# Patient Record
Sex: MALE | Race: WHITE
[De-identification: names, ages, dates, MRNs, and addresses within clinical notes are randomized per-mention and may not be internally consistent; named-entity substitution may affect disease eponyms.]

---

## 2017-01-28 ENCOUNTER — HOSPITAL ENCOUNTER (OUTPATIENT)
Dept: HOSPITAL 33 - ED | Age: 27
Setting detail: OBSERVATION
LOS: 2 days | Discharge: HOME | End: 2017-01-30
Attending: FAMILY MEDICINE | Admitting: FAMILY MEDICINE
Payer: COMMERCIAL

## 2017-01-28 DIAGNOSIS — Z72.0: ICD-10-CM

## 2017-01-28 DIAGNOSIS — L05.01: Primary | ICD-10-CM

## 2017-01-28 LAB
ALBUMIN SERPL-MCNC: 3.9 G/DL (ref 3.4–5)
ALP SERPL-CCNC: 91 U/L (ref 46–116)
ALT SERPL-CCNC: 23 U/L (ref 12–78)
ANION GAP SERPL CALC-SCNC: 17.1 MEQ/L (ref 5–15)
AST SERPL QL: 18 U/L (ref 15–37)
BASOPHILS NFR BLD AUTO: 0.2 % (ref 0–0.4)
BILIRUB BLD-MCNC: 1.1 MG/DL (ref 0.2–1)
BUN SERPL-MCNC: 14 MG/DL (ref 9–20)
CHLORIDE SERPL-SCNC: 105 MEQ/L (ref 98–107)
CO2 SERPL-SCNC: 24.6 MEQ/L (ref 21–32)
GLUCOSE SERPL-MCNC: 115 MG/DL (ref 70–110)
MCH RBC QN AUTO: 29.8 PG (ref 26–32)
NEUTROPHILS NFR BLD AUTO: 71.2 % (ref 36–66)
PLATELET # BLD AUTO: 194 K/MM3 (ref 150–450)
POTASSIUM SERPLBLD-SCNC: 4.1 MEQ/L (ref 3.5–5.1)
PROT SERPL-MCNC: 7.2 GM/DL (ref 6.4–8.2)
RBC # BLD AUTO: 4.96 M/MM3 (ref 4.1–5.6)
SODIUM SERPL-SCNC: 143 MEQ/L (ref 136–145)
WBC # BLD AUTO: 9.4 K/MM3 (ref 4–10.5)

## 2017-01-28 PROCEDURE — 36000 PLACE NEEDLE IN VEIN: CPT

## 2017-01-28 PROCEDURE — 94762 N-INVAS EAR/PLS OXIMTRY CONT: CPT

## 2017-01-28 PROCEDURE — 85025 COMPLETE CBC W/AUTO DIFF WBC: CPT

## 2017-01-28 PROCEDURE — 99140 ANES COMP EMERGENCY COND: CPT

## 2017-01-28 PROCEDURE — 99284 EMERGENCY DEPT VISIT MOD MDM: CPT

## 2017-01-28 PROCEDURE — 85027 COMPLETE CBC AUTOMATED: CPT

## 2017-01-28 PROCEDURE — 80053 COMPREHEN METABOLIC PANEL: CPT

## 2017-01-28 PROCEDURE — 87070 CULTURE OTHR SPECIMN AEROBIC: CPT

## 2017-01-28 PROCEDURE — 87040 BLOOD CULTURE FOR BACTERIA: CPT

## 2017-01-28 PROCEDURE — 0HB8XZZ EXCISION OF BUTTOCK SKIN, EXTERNAL APPROACH: ICD-10-PCS | Performed by: SURGERY

## 2017-01-28 PROCEDURE — 00300 ANES ALL PX INTEG H/N/PTRUNK: CPT

## 2017-01-28 PROCEDURE — 36415 COLL VENOUS BLD VENIPUNCTURE: CPT

## 2017-01-28 RX ADMIN — MORPHINE SULFATE PRN MG: 1 INJECTION, SOLUTION INTRAVENOUS at 18:26

## 2017-01-28 NOTE — ERPHSYRPT
- History of Present Illness


Time Seen by Provider: 17 10:23


Source: patient


Exam Limitations: no limitations


Patient Subjective Stated Complaint: abscess  on tailbone


Triage Nursing Assessment: patient has 3 cm wide by 5 cm long abscess on 

tailbone in crevice of cheeks on right side


Physician History: 





abscess in mid area of tail bone for 3 days


Timing/Duration: day(s) (3)


Severity: moderate


Associated Symptoms: denies symptoms


Allergies/Adverse Reactions: 








No Known Drug Allergies Allergy (Unverified 17 10:21)


 





Hx Tetanus, Diphtheria Vaccination/Date Given: No


Hx Influenza Vaccination/Date Given: No


Hx Pneumococcal Vaccination/Date Given: No


Immunizations Up to Date: No





- Review of Systems


Constitutional: No Symptoms


Eyes: No Symptoms


Ears, Nose, & Throat: No Symptoms


Respiratory: No Symptoms


Skin: Cellulitis





- Past Medical History


Pertinent Past Medical History: No





- Past Surgical History


Past Surgical History: No





- Social History


Smoking Status: Current every day smoker


Drug Use: none





- Nursing Vital Signs


Nursing Vital Signs: 


 Initial Vital Signs











Temperature                    97.6 F


 


Temperature Source             Oral


 


Pulse Rate                     78


 


Respiratory Rate               20


 


Blood Pressure [Left Arm]      136/84


 


Pain Intensity                 8

















- Physical Exam


General Appearance: no apparent distress, alert


Eye Exam: PERRL/EOMI, eyes nml inspection


Ears, Nose, Throat Exam: normal ENT inspection, TMs normal, pharynx normal, 

moist mucous membranes


Neck Exam: normal inspection, non-tender, supple, full range of motion


Respiratory Exam: normal breath sounds, lungs clear, No respiratory distress


Cardiovascular Exam: regular rate/rhythm, normal heart sounds, normal 

peripheral pulses


Gastrointestinal/Abdomen Exam: soft, normal bowel sounds, No tenderness, No mass


Back Exam: normal inspection, normal range of motion, No CVA tenderness, No 

vertebral tenderness


Extremity Exam: normal inspection, normal range of motion, pelvis stable


Neurologic Exam: alert, oriented x 3, cooperative, normal mood/affect, nml 

cerebellar function, nml station & gait, sensation nml, No motor deficits


Skin Exam: normal color, warm, dry, other (5x5 cms sacrococcygeal abscess ), No 

rash


Lymphatic Exam: No adenopathy


SpO2: 96


Oxygen Delivery: Room Air





- Course


Nursing assessment & vital signs reviewed: Yes


Ordered Tests: 


 Active Orders 24 hr











 Category Date Time Status


 


 IV Insertion STAT Care  17 10:22 Active


 


 BLOOD CULTURE Stat Lab  17 10:40 Received


 


 CBC W DIFF Stat Lab  17 10:30 Completed


 


 CMP Stat Lab  17 10:30 Received


 


 Transfer Order Routine Transfer  17 10:35 Ordered








Medication Summary











Generic Name Dose Route Start Last Admin





  Trade Name Jeevan  PRN Reason Stop Dose Admin


 


Levofloxacin/Dextrose  150 mls @ 100 mls/hr  17 10:22  17 10:26





  Levofloxacin 750mg/150ml D5w  IV  17 11:51  100 mls/hr





  STAT ONE   Administration














Discontinued Medications














Generic Name Dose Route Start Last Admin





  Trade Name Jeevan  PRN Reason Stop Dose Admin


 


Levofloxacin/Dextrose  Confirm  17 10:26  





  Levofloxacin 750mg/150ml D5w  Administered  17 10:27  





  Dose   





  150 mls @ ud   





  IV   





  .STK-MED ONE   











Lab/Rad Data: 


 Laboratory Result Diagrams





 17 10:30 





 Laboratory Results











  17 Range/Units





  10:30 


 


WBC  9.4  (4.0-10.5)  K/mm3


 


RBC  4.96  (4.1-5.6)  M/mm3


 


Hgb  14.8  (12.5-18.0)  gm/dl


 


Hct  43.1  (42-50)  %


 


MCV  86.9  ()  fl


 


MCH  29.8  (26-32)  pg


 


MCHC  34.3  (32-36)  g/dl


 


RDW  13.1  (11.5-14.0)  %


 


Plt Count  194  (150-450)  K/mm3


 


MPV  8.9  (6-9.5)  fl


 


Gran %  71.2 H  (36.0-66.0)  %


 


Lymphocytes %  21.5 L  (24.0-44.0)  %


 


Monocytes %  5.8  (0.0-12.0)  %


 


Eosinophils %  1.3  (0.00-5.0)  %


 


Basophils %  0.2  (0.0-0.4)  %


 


Basophils #  0.02  (0-0.4)  














- Progress


Progress: unchanged, pain not gone completely


Discussed with Dr.: Cesar Henley


Will see patient in: hospital (observation)


Counseled pt/family regarding: lab results, diagnosis, need for follow-up





- Departure


Time of Disposition: 10:34


Departure Disposition: Observation


Clinical Impression: 


 Pilonidal abscess of pedro cleft





Condition: Fair


Critical Care Time: Yes


Critical Care Time(excluding separately billable procedures): 30-74 minutes


Referrals: 


ASHLYN AGUILAR MD [Primary Care Provider] -

## 2017-01-28 NOTE — PCM.SSS
History of Present Illness





- Chief Complaint


Chief Complaint: cellulitis


History of Present Illness: 


 is a 26 year old male who started having buttock pain 6 days ago.  He 

started treating with a lotion he had from a pilonidal cyst 2 years ago, but 

when it did not improve he came to [facility] today.  They did an I&D and it has been 

draining somewhat, but he was in so much pain he was sent to ER and admitted 

for surgical I&D. No fever. colin po (currently NPO for surgery).





- Review of Systems


Respiratory: Cough (few weeks ago, resolved)


Genitourinary Symptoms: Other (pain and swelling at proximal gluteal cleft)


Skin: Cellulitis


Psychological: Other (would really like a cigarette), No Suicidal Ideations





Medications & Allergies


Home Medications: 


 Home Medication List





No Reportable Medications [No Reported Medications]  01/28/17 [History 

Confirmed 01/28/17]








Allergies/Adverse Reactions: 


 Allergies











Allergy/AdvReac Type Severity Reaction Status Date / Time


 


No Known Drug Allergies Allergy   Unverified 01/28/17 10:21














- Past Medical History


Past Medical History: No


Neurological History: No Pertinent History


ENT History: No Pertinent History


Cardiac History: No Pertinent History


Respiratory History: No Pertinent History


Endocrine Medical History: No Pertinent History


Musculoskelatal History: No Pertinent History


GI Medical History: No Pertinent History


 History: No Pertinent History


Pyscho-Social History: No Pertinent History


Male Reproductive Disorders: No Pertinent History





- Past Surgical History


Past Surgical History: No


Neuro Surgical History: No Pertinent History


Cardiac History: No Pertinent History


Respiratory Surgery: No Pertinent History


GI Surgical History: No Pertinent History


Genitourinary Surgical Hx: No Pertinent History


Musculskeletal Surgical Hx: No Pertinent History


Male Surgical History: No Pertinent History


Other Surgical History: pt states he has had wisdom teeth removed, this is his 

only surgery





- Social History


Smoking Status: Current every day smoker


How long have you smoked: 15 yrs


Exposure to second hand smoke: No


Alcohol: Occasionally


Drug Use: none





- Physical Exam


Vital Signs: 


 Vital Signs - 24 hr











  Temp Pulse Resp BP BP Pulse Ox


 


 01/28/17 15:40  97.6 F  74  18  139/82   96


 


 01/28/17 11:31       96


 


 01/28/17 11:09  97.6 F  74  18   139/82  97


 


 01/28/17 11:00       96


 


 01/28/17 10:51       96


 


 01/28/17 10:05  97.6 F  78  20   136/84  96











General Appearance: mild distress


Neurologic Exam: alert, oriented x 3, cooperative


Eye Exam: eyes nml inspection


Ears, Nose, Throat Exam: moist mucous membranes


Neck Exam: normal inspection, non-tender, No lymphadenopathy


Respiratory Exam: normal breath sounds, lungs clear, No crackles/rales, No 

rhonchi, No wheezing


Cardiovascular Exam: regular rate/rhythm, normal heart sounds, No murmur


Gastrointestinal/Abdomen Exam: soft, normal bowel sounds, No tenderness, No 

distention, No guarding


Rectal Exam: other (superior gluteal cleft (gentle, limited exam) on the R s/p I

&D, some purple discoloration, no overt induration or fluctuance, pt is very 

tender)


Back Exam: normal inspection


Extremity Exam: No pedal edema, No swelling


Skin Exam: warm, dry





Assessment/Plan


(1) Pilonidal abscess


Current Visit: Yes   Status: Acute   


Assessment & Plan: 


surgery to evaluate, thank you!  Likely home after I&D.


Code(s): L05.01 - PILONIDAL CYST WITH ABSCESS   





(2) Tobacco abuse


Current Visit: Yes   Status: Acute   


Assessment & Plan: 


I offered nicotine patch but he defers.


Code(s): Z72.0 - TOBACCO USE   





Hospital Summary





- Hospital Course


Hospital Course: 





Pt admitted through ER with pilonidal cyst.  Surgery is consulted; if I&D done 

I assume he will be discharged soon after.





- Vitals & Intake/Output


Vital Signs: 


 Vital Signs











Temperature  97.6 F   01/28/17 15:40


 


Pulse Rate  74   01/28/17 15:40


 


Respiratory Rate  18   01/28/17 15:40


 


Blood Pressure  139/82   01/28/17 15:40


 


O2 Sat by Pulse Oximetry  96   01/28/17 15:40











Intake & Output: 


 Intake & Output











 01/26/17 01/27/17 01/28/17 01/29/17





 11:59 11:59 11:59 11:59


 


Weight   120.656 kg 120.656 kg














- Lab


Result Diagrams: 


 01/28/17 10:30





 01/28/17 10:30





- Procedures and Test


Procedures and Tests throughout Hospitalization: 


 Therapy Orders & Screens





01/28/17 12:06


PT Screen per Nursing Assess ONCE 


   Comment: Protocol Order


   Physician Instructions: Greater than 3 points order PT Admission Screenin


   Reason For Exam: Triggered on Admission


   Diagnosis: cellulitis


   Open Wound/Cellutlitis/Pressure Ulcers: Yes


   Acute Fx/ORIF/Change in wt bearing status: No


   Severe MUSCULOSKELETAL pain: No


   ADL Dysfunction: No


   Acute CVA w/Hemiparesis/Hemiplegia: No


   Decreased Functional Mobility/Strength: No


   Sprain/Strain: No


   Acute Post-op Mobility Dysfunction: No


   Total Points: 5


Smoking Cessation Education ONCE 


   Comment: 


   Diagnosis: cellulitis


   Smoking Status: Current every day smoker


   How long have you smoked: 15 yrs


   Have you smoked in the past 12 months: Yes


   Approximately how many cigarettes per day: 10


   Do you dip or chew tobacco: Yes














- Discharge


Disposition: Home, Self-Care


Condition: Good


Prescriptions: 


No Action


   No Reportable Medications [No Reported Medications] 


Follow up with: 


ASHLYN AGUILAR MD [Primary Care Provider] - 


Forms:  Patient Portal Information

## 2017-01-29 LAB
MCH RBC QN AUTO: 29.6 PG (ref 26–32)
PLATELET # BLD AUTO: 252 K/MM3 (ref 150–450)
RBC # BLD AUTO: 4.97 M/MM3 (ref 4.1–5.6)
WBC # BLD AUTO: 11.8 K/MM3 (ref 4–10.5)

## 2017-01-29 RX ADMIN — HYDROCODONE BITARTRATE AND ACETAMINOPHEN PRN TAB: 5; 325 TABLET ORAL at 21:51

## 2017-01-29 RX ADMIN — LEVOFLOXACIN SCH MLS/HR: 5 INJECTION, SOLUTION INTRAVENOUS at 09:59

## 2017-01-29 RX ADMIN — MORPHINE SULFATE PRN MG: 1 INJECTION, SOLUTION INTRAVENOUS at 12:29

## 2017-01-29 RX ADMIN — HYDROCODONE BITARTRATE AND ACETAMINOPHEN PRN TAB: 5; 325 TABLET ORAL at 12:22

## 2017-01-29 NOTE — PCM.DS
Discharge Summary


Date of Admission: 


01/28/17 11:00





Admitting Physician: 


EREN HAYNES





Primary Care Provider: 


ASHLYN AGUILAR








Allergies


Allergies





No Known Drug Allergies Allergy (Unverified 01/28/17 10:21)


 











Hospital Summary





- Hospital Course


Hospital Course: 





Pt feeling better, decreased pain. I&D done, packing applied so pt was kept 

overnight by surgery.  Phong po fine.  





- Vitals & Intake/Output


Vital Signs: 





 Vital Signs











Temperature  98.1 F   01/29/17 12:22


 


Pulse Rate  62   01/29/17 07:50


 


Respiratory Rate  18   01/29/17 07:50


 


Blood Pressure  152/69   01/29/17 07:37


 


O2 Sat by Pulse Oximetry  97   01/29/17 12:29











Intake & Output: 





 Intake & Output











 01/27/17 01/28/17 01/29/17 01/30/17





 11:59 11:59 11:59 11:59


 


Intake Total   1884 


 


Output Total   500 


 


Balance   1384 


 


Weight  120.656 kg 120.656 kg 














- Lab


Result Diagrams: 


 01/29/17 05:30





 01/28/17 10:30


Lab Results-Last 24 Hrs: 





 Lab Results-Last 24 Hours











  01/29/17 Range/Units





  05:30 


 


WBC  11.8 H  (4.0-10.5)  K/mm3


 


RBC  4.97  (4.1-5.6)  M/mm3


 


Hgb  14.7  (12.5-18.0)  gm/dl


 


Hct  43.9  (42-50)  %


 


MCV  88.3  ()  fl


 


MCH  29.6  (26-32)  pg


 


MCHC  33.5  (32-36)  g/dl


 


RDW  13.1  (11.5-14.0)  %


 


Plt Count  252  (150-450)  K/mm3


 


MPV  9.1  (6-9.5)  fl











Micro Results-Entire Visit: 





 Microbiology











 01/28/17 17:16 Gram Stain - Final





 Coccyx 














- Procedures and Test


Procedures and Tests throughout Hospitalization: 





 Therapy Orders & Screens





01/28/17 12:06


PT Screen per Nursing Assess ONCE 


   Comment: Protocol Order


   Physician Instructions: Greater than 3 points order PT Admission Screenin


   Reason For Exam: Triggered on Admission


   Diagnosis: cellulitis


   Open Wound/Cellutlitis/Pressure Ulcers: Yes


   Acute Fx/ORIF/Change in wt bearing status: No


   Severe MUSCULOSKELETAL pain: No


   ADL Dysfunction: No


   Acute CVA w/Hemiparesis/Hemiplegia: No


   Decreased Functional Mobility/Strength: No


   Sprain/Strain: No


   Acute Post-op Mobility Dysfunction: No


   Total Points: 5


Smoking Cessation Education ONCE 


   Comment: 


   Diagnosis: cellulitis


   Smoking Status: Current every day smoker


   How long have you smoked: 15 yrs


   Have you smoked in the past 12 months: Yes


   Approximately how many cigarettes per day: 10


   Do you dip or chew tobacco: Yes





01/28/17 18:11


Incentive Spirometry Assessmen TID 


   Comment: 


   Diagnosis: cellulitis














Discharge Exam


General Appearance: no apparent distress


Neurologic Exam: alert, oriented x 3, cooperative


Skin Exam: normal color, warm, dry


Respiratory Exam: normal breath sounds, lungs clear, No wheezing


Cardiovascular Exam: regular rate/rhythm, normal heart sounds


Rectal Exam: other (superior gluteal cleft with minimal induration and 

tenderness.  Packing in place with evidence of bloody and serous fluid.  no 

erythema.  Basically nttp.)





Final Diagnosis/Problem List





- Final Discharge Diagnosis/Problem


(1) Pilonidal abscess


Current Visit: Yes   Status: Acute   


Assessment & Plan: 


I&D from surgery yesterday.  Packing in place; surgery to re-eval today.  OK to 

d/c from my perspective when OK with surgery.








(2) Tobacco abuse


Current Visit: Yes   Status: Chronic   


Assessment & Plan: 


pt refusing nicotine patch.








- Discharge


Disposition: Home, Self-Care


Condition: Good


Prescriptions: 


No Action


   No Reportable Medications [No Reported Medications] 


Follow up with: 


ASHLYN AGUILAR MD [Primary Care Provider] - 


Forms:  Patient Portal Information

## 2017-01-30 VITALS — HEART RATE: 81 BPM | SYSTOLIC BLOOD PRESSURE: 134 MMHG | DIASTOLIC BLOOD PRESSURE: 85 MMHG | OXYGEN SATURATION: 94 %

## 2017-01-30 RX ADMIN — HYDROCODONE BITARTRATE AND ACETAMINOPHEN PRN TAB: 5; 325 TABLET ORAL at 09:19

## 2017-01-30 RX ADMIN — HYDROCODONE BITARTRATE AND ACETAMINOPHEN PRN TAB: 5; 325 TABLET ORAL at 04:16

## 2017-01-30 RX ADMIN — LEVOFLOXACIN SCH MLS/HR: 5 INJECTION, SOLUTION INTRAVENOUS at 10:11

## 2017-01-30 NOTE — OP
SURGERY DATE/TIME:    01/28/2017 1652



PREOPERATIVE DIAGNOSIS:    Pilonidal cyst disease acute exacerbation upper right. 



POSTOPERATIVE DIAGNOSIS:  Pilonidal cyst disease acute exacerbation upper right. 



PROCEDURE:    Excisional of pilonidal cyst disease measuring 3 x 2 inches predominantly 
central and right in the upper aspect of the pilonidal zone with open packing. 



SURGEON:        Hoang Crandall M.D.



ANESTHESIA:    General.



COMPLICATIONS:    None.



CONDITION:        Stable.



INDICATION:  A 26 year-old had pilonidal cyst before and had treatment of it about two 
years ago, I believe complete excision. He now presents for exacerbation that has been 
going on and off for a month. He had it lanced [place] [facility] but it is still very, very 
painful.  He cannot move or lay on it and it is still acutely inflamed.     



DESCRIPTION OF PROCEDURE:   He was taken to surgery and laid in left lateral decubitus 
position. This worked out very nicely. Routine prep and drape. It was extending in the 
upper part of the old pilonidal zone and in the central and expansion out to the right. 
This was elliptically excised. Hemostasis obtained with electrocautery. Residual 
granulation tissue was cauterized and packed with Iodoform. Sterile dressing applied. The 
patient tolerated the procedure satisfactorily. Findings discussed with the family in the 
waiting room.

## 2017-01-30 NOTE — PCM.NOTE
Date and Time: 01/30/17 0729





Subjective Assessment: 





patient is feeling much better today, had significant bleeding from surgical 

site yesterday, has improved today. his pain is controlled, overall he has no 

complaints





Objective Exam


General Appearance: no apparent distress, alert


Respiratory Exam: normal breath sounds, lungs clear, No respiratory distress


Cardiovascular Exam: regular rate/rhythm, normal heart sounds


Gastrointestinal/Abdomen Exam: soft, No tenderness, No mass


Rectal Exam: other (open area clean, signficant amount of blood in packing and 

dressing materials, no active bleeding)





OBJECTIVE DATA


Vital Signs: 


 Vital Signs - 24 hr











  Temp Pulse Resp BP Pulse Ox


 


 01/30/17 07:11  98.0 F  51 L  16  104/59  97


 


 01/30/17 04:00  98.5 F  52 L  17  131/66  100


 


 01/30/17 00:00  97.9 F  62  17  130/62  95


 


 01/29/17 19:59  97.6 F  74  16  137/79  96


 


 01/29/17 16:00  98.2 F  69  20  127/75  97


 


 01/29/17 12:29      97


 


 01/29/17 12:22  98.1 F    


 


 01/29/17 12:00  98.1 F  84  19  125/73  95


 


 01/29/17 10:00      97


 


 01/29/17 08:00      97


 


 01/29/17 07:50   62  18   97


 


 01/29/17 07:37  98.1 F  88  18  152/69  97








 Pain Assessment - Last Documented











Pain Intensity                 5


 


[facility]                [facility]











Intake and Output: 


 Intake & Output











 01/27/17 01/28/17 01/29/17 01/30/17





 11:59 11:59 11:59 11:59


 


Intake Total   1884 780


 


Output Total   500 


 


Balance   1384 780


 


Weight  120.656 kg 120.656 kg 














Assessment/Plan


(1) Pilonidal abscess


Current Visit: Yes   Status: Acute   


Assessment & Plan: 


on levaquin, will stop lovenox at this time and continue current management, 

patient is ambulatory so encouraged activity to prevent DVT


Code(s): L05.01 - PILONIDAL CYST WITH ABSCESS

## 2023-07-24 ENCOUNTER — HOSPITAL ENCOUNTER (EMERGENCY)
Dept: HOSPITAL 33 - ED | Age: 33
Discharge: HOME | End: 2023-07-24
Payer: COMMERCIAL

## 2023-07-24 VITALS — RESPIRATION RATE: 18 BRPM | SYSTOLIC BLOOD PRESSURE: 126 MMHG | DIASTOLIC BLOOD PRESSURE: 86 MMHG | HEART RATE: 70 BPM

## 2023-07-24 VITALS — TEMPERATURE: 97.5 F

## 2023-07-24 VITALS — OXYGEN SATURATION: 98 %

## 2023-07-24 DIAGNOSIS — Z63.5: ICD-10-CM

## 2023-07-24 DIAGNOSIS — R20.2: ICD-10-CM

## 2023-07-24 DIAGNOSIS — Z72.0: ICD-10-CM

## 2023-07-24 DIAGNOSIS — R06.02: ICD-10-CM

## 2023-07-24 DIAGNOSIS — R07.9: Primary | ICD-10-CM

## 2023-07-24 LAB
ALBUMIN SERPL-MCNC: 3.8 G/DL (ref 3.5–5)
ALP SERPL-CCNC: 70 U/L (ref 38–126)
ALT SERPL-CCNC: 27 U/L (ref 0–50)
ANION GAP SERPL CALC-SCNC: 11.2 MEQ/L (ref 5–15)
AST SERPL QL: 42 U/L (ref 17–59)
BASOPHILS # BLD AUTO: 0.07 X10^3/UL (ref 0–0.4)
BASOPHILS NFR BLD AUTO: 0.7 % (ref 0–0.4)
BILIRUB BLD-MCNC: 0.7 MG/DL (ref 0.2–1.3)
BUN SERPL-MCNC: 11 MG/DL (ref 9–20)
CALCIUM SPEC-MCNC: 8.4 MG/DL (ref 8.4–10.2)
CHLORIDE SERPL-SCNC: 106 MMOL/L (ref 98–107)
CK SERPL-CCNC: 94 U/L (ref 55–170)
CO2 SERPL-SCNC: 26 MMOL/L (ref 22–30)
CREAT SERPL-MCNC: 0.68 MG/DL (ref 0.66–1.25)
EOSINOPHIL # BLD AUTO: 0.31 X10^3/UL (ref 0–0.5)
GFR SERPLBLD BASED ON 1.73 SQ M-ARVRAT: > 60 ML/MIN
GLUCOSE SERPL-MCNC: 131 MG/DL (ref 74–106)
HCT VFR BLD AUTO: 43.1 % (ref 42–50)
HGB BLD-MCNC: 14.4 G/DL (ref 12.5–18)
IMM GRANULOCYTES # BLD: 0.05 X10^3U/L (ref 0–0.03)
IMM GRANULOCYTES NFR BLD: 0.5 % (ref 0–0.4)
LYMPHOCYTES # SPEC AUTO: 2.66 X10^3/UL (ref 1–4.6)
MCH RBC QN AUTO: 31.4 PG (ref 26–32)
MCHC RBC AUTO-ENTMCNC: 33.4 G/DL (ref 32–36)
MONOCYTES # BLD AUTO: 0.43 X10^3/UL (ref 0–1.3)
NRBC # BLD AUTO: 0 X10^3U/L (ref 0–0.01)
NRBC BLD AUTO-RTO: 0 % (ref 0–0.1)
PLATELET # BLD AUTO: 213 X10^3/UL (ref 150–450)
POTASSIUM SERPLBLD-SCNC: 4.5 MMOL/L (ref 3.5–5.1)
PROT SERPL-MCNC: 6.6 G/DL (ref 6.3–8.2)
RBC # BLD AUTO: 4.59 X10^6/UL (ref 4.1–5.6)
SODIUM SERPL-SCNC: 138 MMOL/L (ref 137–145)
WBC # BLD AUTO: 9.6 X10^3/UL (ref 4–10.5)

## 2023-07-24 PROCEDURE — 71250 CT THORAX DX C-: CPT

## 2023-07-24 PROCEDURE — 84484 ASSAY OF TROPONIN QUANT: CPT

## 2023-07-24 PROCEDURE — 93005 ELECTROCARDIOGRAM TRACING: CPT

## 2023-07-24 PROCEDURE — 99284 EMERGENCY DEPT VISIT MOD MDM: CPT

## 2023-07-24 PROCEDURE — 80053 COMPREHEN METABOLIC PANEL: CPT

## 2023-07-24 PROCEDURE — 85379 FIBRIN DEGRADATION QUANT: CPT

## 2023-07-24 PROCEDURE — 93041 RHYTHM ECG TRACING: CPT

## 2023-07-24 PROCEDURE — 82550 ASSAY OF CK (CPK): CPT

## 2023-07-24 PROCEDURE — 36415 COLL VENOUS BLD VENIPUNCTURE: CPT

## 2023-07-24 PROCEDURE — 86140 C-REACTIVE PROTEIN: CPT

## 2023-07-24 PROCEDURE — 85025 COMPLETE CBC W/AUTO DIFF WBC: CPT

## 2023-07-24 PROCEDURE — 36000 PLACE NEEDLE IN VEIN: CPT

## 2023-07-24 PROCEDURE — 94760 N-INVAS EAR/PLS OXIMETRY 1: CPT

## 2023-07-24 PROCEDURE — 71045 X-RAY EXAM CHEST 1 VIEW: CPT

## 2023-07-24 PROCEDURE — 85652 RBC SED RATE AUTOMATED: CPT

## 2023-07-24 RX ADMIN — KETOROLAC TROMETHAMINE ONE: 30 INJECTION, SOLUTION INTRAMUSCULAR; INTRAVENOUS at 09:28

## 2023-07-24 RX ADMIN — KETOROLAC TROMETHAMINE ONE MG: 30 INJECTION, SOLUTION INTRAMUSCULAR; INTRAVENOUS at 09:21

## 2023-07-24 SDOH — SOCIAL STABILITY - SOCIAL INSECURITY: DISRUPTION OF FAMILY BY SEPARATION AND DIVORCE: Z63.5

## 2023-07-24 NOTE — XRAY
Indication: Chest pain.



Comparison: November 29, 2021



Portable apical lordotic chest again demonstrates normal heart, lungs, and

bony thorax.

## 2023-07-24 NOTE — XRAY
Indication: Chest pain.



Multiple contiguous axial images obtained through the chest without contrast.



Comparison: None



Lungs inflated and clear.  Heart not enlarged.  Aorta is normal in course and

caliber.  No pathologic mediastinal lymphadenopathy.



Bony thorax intact.  Limited upper abdomen including adrenal glands are

unremarkable.



Impression: Normal CT chest without contrast exam.
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
